# Patient Record
Sex: MALE | Race: WHITE | NOT HISPANIC OR LATINO | Employment: FULL TIME | URBAN - METROPOLITAN AREA
[De-identification: names, ages, dates, MRNs, and addresses within clinical notes are randomized per-mention and may not be internally consistent; named-entity substitution may affect disease eponyms.]

---

## 2022-06-12 ENCOUNTER — HOSPITAL ENCOUNTER (EMERGENCY)
Facility: CLINIC | Age: 47
Discharge: HOME | End: 2022-06-12
Attending: EMERGENCY MEDICINE | Admitting: EMERGENCY MEDICINE
Payer: OTHER MISCELLANEOUS

## 2022-06-12 VITALS
HEART RATE: 110 BPM | OXYGEN SATURATION: 100 % | RESPIRATION RATE: 12 BRPM | SYSTOLIC BLOOD PRESSURE: 150 MMHG | HEIGHT: 73 IN | BODY MASS INDEX: 27.83 KG/M2 | WEIGHT: 210 LBS | DIASTOLIC BLOOD PRESSURE: 84 MMHG | TEMPERATURE: 98.5 F

## 2022-06-12 DIAGNOSIS — E86.0 DEHYDRATION: ICD-10-CM

## 2022-06-12 LAB
ATRIAL RATE - MUSE: 107 BPM
COHGB MFR BLD: 1.4 % (ref 0–2)
DIASTOLIC BLOOD PRESSURE - MUSE: NORMAL MMHG
INTERPRETATION ECG - MUSE: NORMAL
P AXIS - MUSE: 69 DEGREES
PR INTERVAL - MUSE: 160 MS
QRS DURATION - MUSE: 96 MS
QT - MUSE: 336 MS
QTC - MUSE: 448 MS
R AXIS - MUSE: 47 DEGREES
SYSTOLIC BLOOD PRESSURE - MUSE: NORMAL MMHG
T AXIS - MUSE: 49 DEGREES
VENTRICULAR RATE- MUSE: 107 BPM

## 2022-06-12 PROCEDURE — 99284 EMERGENCY DEPT VISIT MOD MDM: CPT

## 2022-06-12 PROCEDURE — 36415 COLL VENOUS BLD VENIPUNCTURE: CPT | Performed by: EMERGENCY MEDICINE

## 2022-06-12 PROCEDURE — 82375 ASSAY CARBOXYHB QUANT: CPT | Performed by: EMERGENCY MEDICINE

## 2022-06-12 PROCEDURE — 93005 ELECTROCARDIOGRAM TRACING: CPT

## 2022-06-12 NOTE — ED NOTES
Patient discharged to home. VSS, review of medications and instructions for follow up have been completed
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
## 2022-09-11 ENCOUNTER — HOSPITAL ENCOUNTER (EMERGENCY)
Facility: CLINIC | Age: 47
Discharge: HOME | End: 2022-09-11
Attending: EMERGENCY MEDICINE | Admitting: EMERGENCY MEDICINE
Payer: COMMERCIAL

## 2022-09-11 VITALS
HEART RATE: 97 BPM | SYSTOLIC BLOOD PRESSURE: 114 MMHG | DIASTOLIC BLOOD PRESSURE: 86 MMHG | BODY MASS INDEX: 27.83 KG/M2 | WEIGHT: 210 LBS | HEIGHT: 73 IN | RESPIRATION RATE: 13 BRPM | TEMPERATURE: 97.1 F | OXYGEN SATURATION: 98 %

## 2022-09-11 DIAGNOSIS — I48.91 ATRIAL FIBRILLATION WITH RVR (H): ICD-10-CM

## 2022-09-11 LAB
ALBUMIN SERPL-MCNC: 4 G/DL (ref 3.4–5)
ALP SERPL-CCNC: 44 U/L (ref 40–150)
ALT SERPL W P-5'-P-CCNC: 55 U/L (ref 0–70)
ANION GAP SERPL CALCULATED.3IONS-SCNC: 10 MMOL/L (ref 3–14)
AST SERPL W P-5'-P-CCNC: 25 U/L (ref 0–45)
ATRIAL RATE - MUSE: 95 BPM
BASOPHILS # BLD AUTO: 0.1 10E3/UL (ref 0–0.2)
BASOPHILS NFR BLD AUTO: 1 %
BILIRUB SERPL-MCNC: 0.4 MG/DL (ref 0.2–1.3)
BUN SERPL-MCNC: 20 MG/DL (ref 7–30)
CALCIUM SERPL-MCNC: 9.1 MG/DL (ref 8.5–10.1)
CHLORIDE BLD-SCNC: 99 MMOL/L (ref 94–109)
CO2 SERPL-SCNC: 24 MMOL/L (ref 20–32)
CREAT SERPL-MCNC: 1.33 MG/DL (ref 0.66–1.25)
DIASTOLIC BLOOD PRESSURE - MUSE: NORMAL MMHG
EOSINOPHIL # BLD AUTO: 0.1 10E3/UL (ref 0–0.7)
EOSINOPHIL NFR BLD AUTO: 1 %
ERYTHROCYTE [DISTWIDTH] IN BLOOD BY AUTOMATED COUNT: 12.3 % (ref 10–15)
GFR SERPL CREATININE-BSD FRML MDRD: 67 ML/MIN/1.73M2
GLUCOSE BLD-MCNC: 113 MG/DL (ref 70–99)
HCT VFR BLD AUTO: 41.3 % (ref 40–53)
HGB BLD-MCNC: 14.3 G/DL (ref 13.3–17.7)
HOLD SPECIMEN: NORMAL
IMM GRANULOCYTES # BLD: 0 10E3/UL
IMM GRANULOCYTES NFR BLD: 0 %
INTERPRETATION ECG - MUSE: NORMAL
LYMPHOCYTES # BLD AUTO: 2 10E3/UL (ref 0.8–5.3)
LYMPHOCYTES NFR BLD AUTO: 28 %
MCH RBC QN AUTO: 30 PG (ref 26.5–33)
MCHC RBC AUTO-ENTMCNC: 34.6 G/DL (ref 31.5–36.5)
MCV RBC AUTO: 87 FL (ref 78–100)
MONOCYTES # BLD AUTO: 0.6 10E3/UL (ref 0–1.3)
MONOCYTES NFR BLD AUTO: 9 %
NEUTROPHILS # BLD AUTO: 4.4 10E3/UL (ref 1.6–8.3)
NEUTROPHILS NFR BLD AUTO: 61 %
NRBC # BLD AUTO: 0 10E3/UL
NRBC BLD AUTO-RTO: 0 /100
P AXIS - MUSE: 44 DEGREES
PLATELET # BLD AUTO: 239 10E3/UL (ref 150–450)
POTASSIUM BLD-SCNC: 4.3 MMOL/L (ref 3.4–5.3)
PR INTERVAL - MUSE: 152 MS
PROT SERPL-MCNC: 7.4 G/DL (ref 6.8–8.8)
QRS DURATION - MUSE: 86 MS
QT - MUSE: 326 MS
QTC - MUSE: 409 MS
R AXIS - MUSE: 27 DEGREES
RBC # BLD AUTO: 4.76 10E6/UL (ref 4.4–5.9)
SODIUM SERPL-SCNC: 133 MMOL/L (ref 133–144)
SYSTOLIC BLOOD PRESSURE - MUSE: NORMAL MMHG
T AXIS - MUSE: 52 DEGREES
TROPONIN I SERPL HS-MCNC: 50 NG/L
TSH SERPL DL<=0.005 MIU/L-ACNC: 2.09 MU/L (ref 0.4–4)
VENTRICULAR RATE- MUSE: 95 BPM
WBC # BLD AUTO: 7.2 10E3/UL (ref 4–11)

## 2022-09-11 PROCEDURE — 84443 ASSAY THYROID STIM HORMONE: CPT | Performed by: EMERGENCY MEDICINE

## 2022-09-11 PROCEDURE — 85025 COMPLETE CBC W/AUTO DIFF WBC: CPT | Performed by: EMERGENCY MEDICINE

## 2022-09-11 PROCEDURE — 99285 EMERGENCY DEPT VISIT HI MDM: CPT | Mod: 25

## 2022-09-11 PROCEDURE — 96361 HYDRATE IV INFUSION ADD-ON: CPT | Mod: 59

## 2022-09-11 PROCEDURE — 93005 ELECTROCARDIOGRAM TRACING: CPT | Mod: 76

## 2022-09-11 PROCEDURE — 96360 HYDRATION IV INFUSION INIT: CPT | Mod: 59

## 2022-09-11 PROCEDURE — 92960 CARDIOVERSION ELECTRIC EXT: CPT

## 2022-09-11 PROCEDURE — 36415 COLL VENOUS BLD VENIPUNCTURE: CPT | Performed by: EMERGENCY MEDICINE

## 2022-09-11 PROCEDURE — 999N000054 HC STATISTIC EKG NON-CHARGEABLE

## 2022-09-11 PROCEDURE — 99152 MOD SED SAME PHYS/QHP 5/>YRS: CPT

## 2022-09-11 PROCEDURE — 80053 COMPREHEN METABOLIC PANEL: CPT | Performed by: EMERGENCY MEDICINE

## 2022-09-11 PROCEDURE — 250N000011 HC RX IP 250 OP 636: Performed by: EMERGENCY MEDICINE

## 2022-09-11 PROCEDURE — 258N000003 HC RX IP 258 OP 636: Performed by: EMERGENCY MEDICINE

## 2022-09-11 PROCEDURE — 84484 ASSAY OF TROPONIN QUANT: CPT | Performed by: EMERGENCY MEDICINE

## 2022-09-11 PROCEDURE — 93005 ELECTROCARDIOGRAM TRACING: CPT

## 2022-09-11 PROCEDURE — 82040 ASSAY OF SERUM ALBUMIN: CPT | Performed by: EMERGENCY MEDICINE

## 2022-09-11 RX ORDER — PROPOFOL 10 MG/ML
INJECTION, EMULSION INTRAVENOUS
Status: DISCONTINUED
Start: 2022-09-11 | End: 2022-09-11 | Stop reason: HOSPADM

## 2022-09-11 RX ORDER — PROPOFOL 10 MG/ML
INJECTION, EMULSION INTRAVENOUS DAILY PRN
Status: COMPLETED | OUTPATIENT
Start: 2022-09-11 | End: 2022-09-11

## 2022-09-11 RX ADMIN — SODIUM CHLORIDE 1000 ML: 9 INJECTION, SOLUTION INTRAVENOUS at 03:29

## 2022-09-11 RX ADMIN — PROPOFOL 70 MG: 10 INJECTION, EMULSION INTRAVENOUS at 03:52

## 2022-09-11 ASSESSMENT — ACTIVITIES OF DAILY LIVING (ADL): ADLS_ACUITY_SCORE: 35

## 2022-09-11 ASSESSMENT — ENCOUNTER SYMPTOMS
SHORTNESS OF BREATH: 1
NAUSEA: 1
VOMITING: 1
SLEEP DISTURBANCE: 1

## 2022-09-11 NOTE — ED NOTES
Patient resting comfortably in bed. Denies any cardiac symptoms. HR 92 NSR on monitor. No further needs at this time.

## 2022-09-11 NOTE — ED TRIAGE NOTES
Came in from home. Around 2200 last night got dizzy and nauseated. Started having weakness and chest pains after laying down. Afib rvr on EKG in triage. States 8-9 beers last night.      Triage Assessment     Row Name 09/11/22 0320       Triage Assessment (Adult)    Airway WDL WDL       Respiratory WDL    Respiratory WDL WDL       Skin Circulation/Temperature WDL    Skin Circulation/Temperature WDL WDL       Cardiac WDL    Cardiac WDL --  afib rvr 180s       Peripheral/Neurovascular WDL    Peripheral Neurovascular WDL WDL       Cognitive/Neuro/Behavioral WDL    Cognitive/Neuro/Behavioral WDL WDL

## 2022-09-11 NOTE — ED PROVIDER NOTES
History   Chief Complaint:  Chest Pain       The history is provided by the patient.      Rehan Khan is a 46 year old male with history of hyperlipidemia and hypertension who presents with palpitations. The patient provides that at 2200 tonight, he suddenly developed lightheadedness, nausea, vomiting, and palpitations. He tried going to bed but notes that he had shortness of breath, chest pain, shoulder pain, and neck pain that made it difficult to sleep. He has not had this before and denies having any leg pain. He further denies any tobacco, alcohol, or drug use.    Review of Systems   Respiratory: Positive for shortness of breath.    Cardiovascular: Positive for chest pain.   Gastrointestinal: Positive for nausea and vomiting.   Musculoskeletal:        (+) left shoulder and neck pain  (-) leg pain   Psychiatric/Behavioral: Positive for sleep disturbance.   All other systems reviewed and are negative.      Allergies:  The patient has no known allergies.     Medications:  Zestril  Revatio    Past Medical History:     Hyperlipidemia  Hyperglycemia  Erectile dysfunction  Hypertension    Past Surgical History:    Hernia repair  ACL reconstruction    Family History:    Cancer  Melanoma    Social History:  Patient came from home.  Patient is accompanied in the ED by his wife.  Patient denies tobacco use.  Patient denies alcohol use.  PCP: No Ref-Primary, Physician     Physical Exam     Patient Vitals for the past 24 hrs:   BP Temp Temp src Pulse Resp SpO2 Height Weight   09/11/22 0545 114/86 -- -- 97 13 98 % -- --   09/11/22 0530 122/85 -- -- 93 13 96 % -- --   09/11/22 0515 120/84 -- -- 90 15 98 % -- --   09/11/22 0500 117/82 -- -- 90 14 97 % -- --   09/11/22 0445 108/80 -- -- 93 14 97 % -- --   09/11/22 0430 108/75 -- -- 96 16 95 % -- --   09/11/22 0415 119/77 -- -- 93 16 97 % -- --   09/11/22 0410 109/76 -- -- 90 14 99 % -- --   09/11/22 0405 112/77 -- -- 92 16 99 % -- --   09/11/22 0400 108/75 -- -- 90 16  "96 % -- --   09/11/22 0355 116/80 -- -- (!) 163 11 100 % -- --   09/11/22 0350 (!) 139/116 -- -- (!) 168 21 95 % -- --   09/11/22 0345 -- -- -- (!) 174 17 -- -- --   09/11/22 0340 (!) 134/120 -- -- (!) 174 14 -- -- --   09/11/22 0337 -- 97.1  F (36.2  C) Temporal -- -- -- -- --   09/11/22 0330 (!) 119/91 -- -- (!) 165 16 100 % -- --   09/11/22 0320 (!) 88/65 -- -- (!) 176 16 100 % -- --   09/11/22 0318 -- -- -- (!) 181 18 100 % 1.854 m (6' 1\") 95.3 kg (210 lb)   09/11/22 0307 100/40 -- -- 61 20 99 % -- --       Physical Exam  General: Appears well-developed and well-nourished.   Head: No signs of trauma.   CV: Tachycardic   Resp: Effort normal and breath sounds normal. No respiratory distress.   GI: Soft. There is no tenderness.  No rebound or guarding.  Normal bowel sounds.   MSK: Normal range of motion. no edema. No Calf tenderness.  Neuro: The patient is alert and oriented.  Strength in upper/lower extremities normal and symmetrical. Sensation normal. Speech normal.  Skin: Skin is warm and dry. No rash noted.   Psych: normal mood and affect. behavior is normal.       Emergency Department Course     ECG  ECG obtained at 0310, ECG read at 0326  Atrial fibrillation with RVR  Abnormal ECG  Rate 176 bpm. RI interval * ms. QRS duration 80 ms. QT/QTc 244/417 ms. P-R-T axes * 42 48.    ECG #2  ECG obtained at 0356, ECG read at 0356  Normal sinus rhythm  Cannot rule out anterior infarct, age undetermined  Abnormal ECG  Rate 95 bpm. RI interval 152 ms. QRS duration 86 ms. QT/QTc 326/409 ms. P-R-T axes 44 27 52.    Laboratory:  Labs Ordered and Resulted from Time of ED Arrival to Time of ED Departure   COMPREHENSIVE METABOLIC PANEL - Abnormal       Result Value    Sodium 133      Potassium 4.3      Chloride 99      Carbon Dioxide (CO2) 24      Anion Gap 10      Urea Nitrogen 20      Creatinine 1.33 (*)     Calcium 9.1      Glucose 113 (*)     Alkaline Phosphatase 44      AST 25      ALT 55      Protein Total 7.4      " Albumin 4.0      Bilirubin Total 0.4      GFR Estimate 67     TROPONIN I - Normal    Troponin I High Sensitivity 50     TSH WITH FREE T4 REFLEX - Normal    TSH 2.09     CBC WITH PLATELETS AND DIFFERENTIAL    WBC Count 7.2      RBC Count 4.76      Hemoglobin 14.3      Hematocrit 41.3      MCV 87      MCH 30.0      MCHC 34.6      RDW 12.3      Platelet Count 239      % Neutrophils 61      % Lymphocytes 28      % Monocytes 9      % Eosinophils 1      % Basophils 1      % Immature Granulocytes 0      NRBCs per 100 WBC 0      Absolute Neutrophils 4.4      Absolute Lymphocytes 2.0      Absolute Monocytes 0.6      Absolute Eosinophils 0.1      Absolute Basophils 0.1      Absolute Immature Granulocytes 0.0      Absolute NRBCs 0.0          Procedures       Sedation     Procedure: Procedural Sedation    Sedation Level: Moderate    Indication: Cardioversion    Consent: Verbal from Patient    Universal protocol: Universal protocol was followed and time out conducted just prior to starting procedure, confirming patient identity, site/side, procedure, patient position, and availability of correct equipment and implants.     Last PO Intake: Emergent procedure    ASA Class: Class 1 - A normal healthy patient     Exam:  Mallampati:  Grade 1 - Soft palate, uvula, and pillars visible    Lungs: Clear   Heart: Tachycardic    Medication: Propofol  Dose: 70 mg     Monitoring:  Monitoring consisted of heart rate, cardiac, continuous pulse oximetry, frequent blood pressure checks.   There was constant attendance by RN until patient recovered and constant attendance by physician until patient stable.   Intubation and emergency airway equipment available.     Response: Vital signs stable, oxygen saturations greater than 92%       Patient Status: Post procedure patient was alert.     Total Physician Drug Administration / Monitoring Time: 10 minutes.     Patient was monitored during recovery and returned to pre-procedure baseline.      Electrical Cardioversion         Procedure: Electrical Cardioversion        Indication: Atrial Fibrillation     Consent: Verbal from Patient     Universal Protocol: Universal protocol was followed and time out conducted just prior to starting procedure, confirming patient identity, site/side, procedure, patient position, and availability of correct equipment and implants.      Sedation: The patient was sedated, see separate procedure note for details.      Procedure Detail:   Electrodes were placed: Anterior/posterior  Trial #1: Synchronized Cardioversion at 120 Joules   Cardioversion was successful      Patient Status:  The patient tolerated the procedure well: Yes. There were no complications.    Emergency Department Course:       Reviewed:  I reviewed nursing notes, vitals, past medical history and Care Everywhere    Assessments:  0326 I obtained history and examined the patient as noted above.   0345 RT is in the room.  0351 Sedation and cardioversion were performed.  0455 I rechecked the patient and explained findings.   0557 I rechecked the patient.    Interventions:  0329 NS 1000 mL IV  0352 Propofol 70 mg IV    Disposition:  The patient was discharged to home.     Impression & Plan     CMS Diagnoses: None    Medical Decision Making:  Rehan Khan is a 46-year-old gentleman who presents due to chest discomfort, racing heart rate, not feeling well.  Symptoms started fairly suddenly at 10 PM tonight.  On presentation he was noted to be in A. fib with RVR.  He does not have a prior history of this.  After discussion of risk and benefits with the patient, I did proceed with cardioversion which was successful and the patient tolerated it quite well.  Following this he had resolution of his symptoms.  I did obtain screening blood work which was reassuring.  While the patient had recent surgery on his foot, he does not have any significant immobilization and has been moving and clinically I do not suspect a  pulmonary embolism.  Patient was discharged home with outpatient follow-up with primary care doctor and I did put in referral for cardiology.  I did not start the patient on anticoagulation given his lack of risk factors and this being a single episode.  He was instructed to return for any further symptoms or further concerns.      Diagnosis:    ICD-10-CM    1. Atrial fibrillation with RVR (H)  I48.91 Adult Cardiology Eval  Referral       Discharge Medications:  There are no discharge medications for this patient.      Scribe Disclosure:  I, Neri Faria, am serving as a scribe at 3:34 AM on 9/11/2022 to document services personally performed by Agustín Kaiser MD based on my observations and the provider's statements to me.        Agustín Kaiser MD  09/11/22 6470

## 2022-09-13 ENCOUNTER — LAB (OUTPATIENT)
Dept: LAB | Facility: CLINIC | Age: 47
End: 2022-09-13

## 2022-09-13 ENCOUNTER — OFFICE VISIT (OUTPATIENT)
Dept: CARDIOLOGY | Facility: CLINIC | Age: 47
End: 2022-09-13
Attending: EMERGENCY MEDICINE
Payer: COMMERCIAL

## 2022-09-13 VITALS
WEIGHT: 210.2 LBS | DIASTOLIC BLOOD PRESSURE: 89 MMHG | HEIGHT: 73 IN | HEART RATE: 79 BPM | SYSTOLIC BLOOD PRESSURE: 131 MMHG | OXYGEN SATURATION: 96 % | BODY MASS INDEX: 27.86 KG/M2

## 2022-09-13 DIAGNOSIS — R07.2 PRECORDIAL PAIN: ICD-10-CM

## 2022-09-13 DIAGNOSIS — I10 PRIMARY HYPERTENSION: ICD-10-CM

## 2022-09-13 DIAGNOSIS — I48.0 PAROXYSMAL ATRIAL FIBRILLATION (H): ICD-10-CM

## 2022-09-13 DIAGNOSIS — I48.0 PAROXYSMAL ATRIAL FIBRILLATION (H): Primary | ICD-10-CM

## 2022-09-13 LAB
CREAT SERPL-MCNC: 1.01 MG/DL (ref 0.66–1.25)
GFR SERPL CREATININE-BSD FRML MDRD: >90 ML/MIN/1.73M2

## 2022-09-13 PROCEDURE — 99204 OFFICE O/P NEW MOD 45 MIN: CPT | Performed by: INTERNAL MEDICINE

## 2022-09-13 PROCEDURE — 82565 ASSAY OF CREATININE: CPT | Performed by: INTERNAL MEDICINE

## 2022-09-13 PROCEDURE — 36415 COLL VENOUS BLD VENIPUNCTURE: CPT | Performed by: INTERNAL MEDICINE

## 2022-09-13 RX ORDER — LISINOPRIL 20 MG/1
TABLET ORAL
COMMUNITY
Start: 2022-08-15

## 2022-09-13 RX ORDER — ASPIRIN 81 MG/1
325 TABLET, COATED ORAL DAILY
COMMUNITY
Start: 2022-08-31

## 2022-09-13 NOTE — LETTER
9/13/2022    KI [address] K[address] [place] [address]    RE: Rehan Douglasscher       Dear Colleague,     I had the pleasure of seeing Rehan Khan in the [facility].  HPI and Plan:   I had the pleasure of seeing Rehan Khan in cardiology clinic for new onset atrial fibrillation.    Rehan is a pleasant 46-year-old male.  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  For the last year he has been having some episodes of exertional shortness of breath as well as his heart rate seems to remain higher for a longer time.  On Saturday night he felt sick.  He was having nausea and vomiting as well as rapid palpitations.  He felt dizzy and was almost passing out before trying to get to bed.  Symptoms persisted at 1 AM he called his wife and they decided to go to the emergency room.  He also had some mild chest discomfort with the palpitations.  He was found to be in rapid atrial fibrillation in the emergency room.  That EKG is not loaded on [facility].  The patient told me that it was done in a separate room and they were not able to loaded.  Subsequent EKGs are in epic but it reveals sinus rhythm.  He was emergently cardioverted and in the emergency room and converted to sinus rhythm.  Since then he has been on aspirin.    He has felt tired since that episode but no recurrent palpitations.  He does have family history of atrial fibrillation with his mother having the same episodes in her 50s and has had 4 episodes since then.  No family history of premature CAD.    His TSH during recent ER visit was normal.  His troponins were normal.  His creatinine was mildly elevated 1.33 but he felt dehydrated.  His creatinine was normal on August 15.    On exam, regular rate and rhythm without murmurs.  Chest was clear to auscultation.  No carotid bruits.  JVP is not raised.  Head is normocephalic.  No focal deficits.  No  peripheral edema    Impression  1.  New onset atrial fibrillation  Patient had an episode of atrial fibrillation with rapid rate recently and required cardioversion in the emergency room.  Subsequent EKGs revealed sinus rhythm.  I was not able to locate the EKG which demonstrated atrial fibrillation.  His TSH is normal.  We will get an echocardiogram to rule out underlying structural heart disease.  His chads vascular score is 1 given longstanding history of hypertension.  At the chads vascular score of 1, he can either be on aspirin or anticoagulation and he prefers to continue aspirin at this time to prevent stroke.  I suggested increasing aspirin to at least 162 to 325 mg daily if no side effects.  Given this was first episode, I am not putting him on a longstanding beta-blocker yet.  If there is recurrent episodes, we may have to consider that or antiarrhythmic therapy or ablation.  At that time will refer him to electrophysiologist.    2.  Chest pain and heartburn  He had episode of chest pain with rapid heart rate as well as heartburn prior to this episode.  He also has exertional shortness of breath for last 1 year.  Given the symptoms, I would like to rule out severe CAD.  Have suggested a CT coronary angiography and we discussed the procedure including dye exposure as well as radiation exposure.  In addition, his creatinine will be checked again to make sure it is normal before we proceed with it.  If CAD is excluded and he continues to have heartburn, gastroesophageal reflux disease should be excluded.    3.  Mild hypertriglyceridemia, triglyceride 231 in August.  LDL was 95 and HDL 48.  He will benefit from low carb diet and avoidance of simple carbohydrates and sugars.    I will have him see us in follow-up with my nurse practitioner after the tests are done.  If they are essentially normal, we can also call him with the results and then have him follow-up with me on an annual basis or earlier as  needed.    Thank you for allowing us to part in the care of his nice patient.    Sincerely,    Jasmeet Vo MD      Today's clinic visit entailed:  Review of external notes as documented elsewhere in note  Review of the result(s) of each unique test - ekg, cbc, bmp, tsh  The following tests were independently interpreted by me as noted in my documentation: ekg  Ordering of each unique test  Prescription drug management  Provider  Link to MDM Help Grid     The level of medical decision making during this visit was of moderate complexity.      Orders Placed This Encounter   Procedures     Creatinine     Follow-Up with Cardiology MADAN     Follow-Up with Cardiology     Echocardiogram Complete       Orders Placed This Encounter   Medications     lisinopril (ZESTRIL) 20 MG tablet     ASPIRIN LOW DOSE 81 MG EC tablet     Sig: Take 81 mg by mouth daily       There are no discontinued medications.      Encounter Diagnoses   Name Primary?     Paroxysmal atrial fibrillation (H) Yes     Primary hypertension      Precordial pain        CURRENT MEDICATIONS:  Current Outpatient Medications   Medication Sig Dispense Refill     ASPIRIN LOW DOSE 81 MG EC tablet Take 81 mg by mouth daily       lisinopril (ZESTRIL) 20 MG tablet          ALLERGIES   No Known Allergies    PAST MEDICAL HISTORY:  Hypertension  PAST SURGICAL HISTORY:  None relevant to this visit  FAMILY HISTORY:  Family history of atrial fibrillation  SOCIAL HISTORY:  Social History     Socioeconomic History     Marital status:      Spouse name: None     Number of children: None     Years of education: None     Highest education level: None   Tobacco Use     Smoking status: Never Smoker     Smokeless tobacco: Never Used   Substance and Sexual Activity     Alcohol use: Yes     Comment: daily     Drug use: Never       Review of Systems:  Skin:          Eyes:         ENT:         Respiratory:  Positive for cough;dyspnea on exertion cough: in the morning  "  Cardiovascular:  chest pain;Negative;edema;dizziness;lightheadedness palpitations;Positive for;fatigue    Gastroenterology:        Genitourinary:         Musculoskeletal:         Neurologic:         Psychiatric:         Heme/Lymph/Imm:  Negative allergies    Endocrine:  Negative        Physical Exam:  Vitals: /89 (BP Location: Left arm, Patient Position: Sitting)   Pulse 79   Ht 1.854 m (6' 1\")   Wt 95.3 kg (210 lb 3.2 oz)   SpO2 96%   BMI 27.73 kg/m        CC  Agustín Kaiser MD  [facility]  [address] [place] [address]    Thank you for allowing me to participate in the care of your patient.      Sincerely,     Jasmeet Vo MD     [facility]  "

## 2022-09-13 NOTE — PROGRESS NOTES
HPI and Plan:   I had the pleasure of seeing Rehan Khan in cardiology clinic for new onset atrial fibrillation.    Rehan is a pleasant 46-year-old male.  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  For the last year he has been having some episodes of exertional shortness of breath as well as his heart rate seems to remain higher for a longer time.  On Saturday night he felt sick.  He was having nausea and vomiting as well as rapid palpitations.  He felt dizzy and was almost passing out before trying to get to bed.  Symptoms persisted at 1 AM he called his wife and they decided to go to the emergency room.  He also had some mild chest discomfort with the palpitations.  He was found to be in rapid atrial fibrillation in the emergency room.  That EKG is not loaded on [facility].  The patient told me that it was done in a separate room and they were not able to loaded.  Subsequent EKGs are in epic but it reveals sinus rhythm.  He was emergently cardioverted and in the emergency room and converted to sinus rhythm.  Since then he has been on aspirin.    He has felt tired since that episode but no recurrent palpitations.  He does have family history of atrial fibrillation with his mother having the same episodes in her 50s and has had 4 episodes since then.  No family history of premature CAD.    His TSH during recent ER visit was normal.  His troponins were normal.  His creatinine was mildly elevated 1.33 but he felt dehydrated.  His creatinine was normal on August 15.    On exam, regular rate and rhythm without murmurs.  Chest was clear to auscultation.  No carotid bruits.  JVP is not raised.  Head is normocephalic.  No focal deficits.  No peripheral edema    Impression  1.  New onset atrial fibrillation  Patient had an episode of atrial fibrillation with rapid rate recently and required cardioversion in the emergency room.  Subsequent EKGs revealed sinus rhythm.  I was  not able to locate the EKG which demonstrated atrial fibrillation.  His TSH is normal.  We will get an echocardiogram to rule out underlying structural heart disease.  His chads vascular score is 1 given longstanding history of hypertension.  At the chads vascular score of 1, he can either be on aspirin or anticoagulation and he prefers to continue aspirin at this time to prevent stroke.  I suggested increasing aspirin to at least 162 to 325 mg daily if no side effects.  Given this was first episode, I am not putting him on a longstanding beta-blocker yet.  If there is recurrent episodes, we may have to consider that or antiarrhythmic therapy or ablation.  At that time will refer him to electrophysiologist.    2.  Chest pain and heartburn  He had episode of chest pain with rapid heart rate as well as heartburn prior to this episode.  He also has exertional shortness of breath for last 1 year.  Given the symptoms, I would like to rule out severe CAD.  Have suggested a CT coronary angiography and we discussed the procedure including dye exposure as well as radiation exposure.  In addition, his creatinine will be checked again to make sure it is normal before we proceed with it.  If CAD is excluded and he continues to have heartburn, gastroesophageal reflux disease should be excluded.    3.  Mild hypertriglyceridemia, triglyceride 231 in August.  LDL was 95 and HDL 48.  He will benefit from low carb diet and avoidance of simple carbohydrates and sugars.    I will have him see us in follow-up with my nurse practitioner after the tests are done.  If they are essentially normal, we can also call him with the results and then have him follow-up with me on an annual basis or earlier as needed.    Thank you for allowing us to part in the care of his nice patient.    Sincerely,    Jasmeet Vo MD      Today's clinic visit entailed:  Review of external notes as documented elsewhere in note  Review of the result(s) of each  unique test - ekg, cbc, bmp, tsh  The following tests were independently interpreted by me as noted in my documentation: ekg  Ordering of each unique test  Prescription drug management  Provider  Link to MDM Help Grid     The level of medical decision making during this visit was of moderate complexity.      Orders Placed This Encounter   Procedures     Creatinine     Follow-Up with Cardiology MADAN     Follow-Up with Cardiology     Echocardiogram Complete       Orders Placed This Encounter   Medications     lisinopril (ZESTRIL) 20 MG tablet     ASPIRIN LOW DOSE 81 MG EC tablet     Sig: Take 81 mg by mouth daily       There are no discontinued medications.      Encounter Diagnoses   Name Primary?     Paroxysmal atrial fibrillation (H) Yes     Primary hypertension      Precordial pain        CURRENT MEDICATIONS:  Current Outpatient Medications   Medication Sig Dispense Refill     ASPIRIN LOW DOSE 81 MG EC tablet Take 81 mg by mouth daily       lisinopril (ZESTRIL) 20 MG tablet          ALLERGIES   No Known Allergies    PAST MEDICAL HISTORY:  Hypertension  PAST SURGICAL HISTORY:  None relevant to this visit  FAMILY HISTORY:  Family history of atrial fibrillation  SOCIAL HISTORY:  Social History     Socioeconomic History     Marital status:      Spouse name: None     Number of children: None     Years of education: None     Highest education level: None   Tobacco Use     Smoking status: Never Smoker     Smokeless tobacco: Never Used   Substance and Sexual Activity     Alcohol use: Yes     Comment: daily     Drug use: Never       Review of Systems:  Skin:          Eyes:         ENT:         Respiratory:  Positive for cough;dyspnea on exertion cough: in the morning   Cardiovascular:  chest pain;Negative;edema;dizziness;lightheadedness palpitations;Positive for;fatigue    Gastroenterology:        Genitourinary:         Musculoskeletal:         Neurologic:         Psychiatric:         Heme/Lymph/Imm:  Negative  "allergies    Endocrine:  Negative        Physical Exam:  Vitals: /89 (BP Location: Left arm, Patient Position: Sitting)   Pulse 79   Ht 1.854 m (6' 1\")   Wt 95.3 kg (210 lb 3.2 oz)   SpO2 96%   BMI 27.73 kg/m        CC  Agustín Kaiser MD  [facility]  [id number] [place] [address]              "

## 2022-09-26 ENCOUNTER — HOSPITAL ENCOUNTER (OUTPATIENT)
Dept: CARDIOLOGY | Facility: CLINIC | Age: 47
Discharge: HOME | End: 2022-09-26
Attending: INTERNAL MEDICINE
Payer: COMMERCIAL

## 2022-09-26 VITALS — SYSTOLIC BLOOD PRESSURE: 118 MMHG | HEART RATE: 67 BPM | DIASTOLIC BLOOD PRESSURE: 78 MMHG

## 2022-09-26 DIAGNOSIS — I48.0 PAROXYSMAL ATRIAL FIBRILLATION (H): ICD-10-CM

## 2022-09-26 DIAGNOSIS — R07.2 PRECORDIAL PAIN: ICD-10-CM

## 2022-09-26 LAB — LVEF ECHO: NORMAL

## 2022-09-26 PROCEDURE — 75574 CT ANGIO HRT W/3D IMAGE: CPT | Mod: 26 | Performed by: INTERNAL MEDICINE

## 2022-09-26 PROCEDURE — 250N000013 HC RX MED GY IP 250 OP 250 PS 637: Performed by: INTERNAL MEDICINE

## 2022-09-26 PROCEDURE — 93306 TTE W/DOPPLER COMPLETE: CPT | Mod: 26 | Performed by: INTERNAL MEDICINE

## 2022-09-26 PROCEDURE — 250N000011 HC RX IP 250 OP 636: Performed by: INTERNAL MEDICINE

## 2022-09-26 PROCEDURE — 93306 TTE W/DOPPLER COMPLETE: CPT

## 2022-09-26 PROCEDURE — 250N000009 HC RX 250: Performed by: INTERNAL MEDICINE

## 2022-09-26 PROCEDURE — 75574 CT ANGIO HRT W/3D IMAGE: CPT

## 2022-09-26 RX ORDER — ACYCLOVIR 200 MG/1
0-1 CAPSULE ORAL
Status: DISCONTINUED | OUTPATIENT
Start: 2022-09-26 | End: 2022-09-27 | Stop reason: HOSPADM

## 2022-09-26 RX ORDER — METOPROLOL TARTRATE 25 MG/1
25-100 TABLET, FILM COATED ORAL
Status: COMPLETED | OUTPATIENT
Start: 2022-09-26 | End: 2022-09-26

## 2022-09-26 RX ORDER — DILTIAZEM HYDROCHLORIDE 5 MG/ML
10-15 INJECTION INTRAVENOUS
Status: DISCONTINUED | OUTPATIENT
Start: 2022-09-26 | End: 2022-09-27 | Stop reason: HOSPADM

## 2022-09-26 RX ORDER — IOPAMIDOL 755 MG/ML
500 INJECTION, SOLUTION INTRAVASCULAR ONCE
Status: COMPLETED | OUTPATIENT
Start: 2022-09-26 | End: 2022-09-26

## 2022-09-26 RX ORDER — METOPROLOL TARTRATE 1 MG/ML
5-15 INJECTION, SOLUTION INTRAVENOUS
Status: DISCONTINUED | OUTPATIENT
Start: 2022-09-26 | End: 2022-09-27 | Stop reason: HOSPADM

## 2022-09-26 RX ORDER — DIPHENHYDRAMINE HYDROCHLORIDE 50 MG/ML
25-50 INJECTION INTRAMUSCULAR; INTRAVENOUS
Status: DISCONTINUED | OUTPATIENT
Start: 2022-09-26 | End: 2022-09-27 | Stop reason: HOSPADM

## 2022-09-26 RX ORDER — METHYLPREDNISOLONE SODIUM SUCCINATE 125 MG/2ML
125 INJECTION, POWDER, LYOPHILIZED, FOR SOLUTION INTRAMUSCULAR; INTRAVENOUS
Status: DISCONTINUED | OUTPATIENT
Start: 2022-09-26 | End: 2022-09-27 | Stop reason: HOSPADM

## 2022-09-26 RX ORDER — NITROGLYCERIN 0.4 MG/1
0.4 TABLET SUBLINGUAL
Status: DISCONTINUED | OUTPATIENT
Start: 2022-09-26 | End: 2022-09-27 | Stop reason: HOSPADM

## 2022-09-26 RX ORDER — IVABRADINE 5 MG/1
5-15 TABLET, FILM COATED ORAL
Status: COMPLETED | OUTPATIENT
Start: 2022-09-26 | End: 2022-09-26

## 2022-09-26 RX ORDER — DIPHENHYDRAMINE HCL 25 MG
25 CAPSULE ORAL
Status: DISCONTINUED | OUTPATIENT
Start: 2022-09-26 | End: 2022-09-27 | Stop reason: HOSPADM

## 2022-09-26 RX ORDER — ONDANSETRON 2 MG/ML
4 INJECTION INTRAMUSCULAR; INTRAVENOUS
Status: DISCONTINUED | OUTPATIENT
Start: 2022-09-26 | End: 2022-09-27 | Stop reason: HOSPADM

## 2022-09-26 RX ORDER — DILTIAZEM HCL 60 MG
120 TABLET ORAL
Status: DISCONTINUED | OUTPATIENT
Start: 2022-09-26 | End: 2022-09-27 | Stop reason: HOSPADM

## 2022-09-26 RX ADMIN — NITROGLYCERIN 0.4 MG: 0.4 TABLET SUBLINGUAL at 10:37

## 2022-09-26 RX ADMIN — METOPROLOL TARTRATE 10 MG: 5 INJECTION INTRAVENOUS at 09:52

## 2022-09-26 RX ADMIN — IVABRADINE 15 MG: 5 TABLET, FILM COATED ORAL at 08:25

## 2022-09-26 RX ADMIN — METOPROLOL TARTRATE 100 MG: 50 TABLET, FILM COATED ORAL at 08:25

## 2022-09-26 RX ADMIN — METOPROLOL TARTRATE 10 MG: 5 INJECTION INTRAVENOUS at 09:36

## 2022-09-26 RX ADMIN — METOPROLOL TARTRATE 10 MG: 5 INJECTION INTRAVENOUS at 09:44

## 2022-09-26 RX ADMIN — METOPROLOL TARTRATE 10 MG: 5 INJECTION INTRAVENOUS at 09:30

## 2022-09-26 RX ADMIN — IOPAMIDOL 120 ML: 755 INJECTION, SOLUTION INTRAVENOUS at 10:50

## 2022-10-01 ENCOUNTER — HEALTH MAINTENANCE LETTER (OUTPATIENT)
Age: 47
End: 2022-10-01

## 2022-10-20 ENCOUNTER — OFFICE VISIT (OUTPATIENT)
Dept: CARDIOLOGY | Facility: CLINIC | Age: 47
End: 2022-10-20
Attending: INTERNAL MEDICINE
Payer: COMMERCIAL

## 2022-10-20 VITALS
OXYGEN SATURATION: 99 % | HEART RATE: 90 BPM | DIASTOLIC BLOOD PRESSURE: 82 MMHG | HEIGHT: 73 IN | BODY MASS INDEX: 28.36 KG/M2 | WEIGHT: 214 LBS | SYSTOLIC BLOOD PRESSURE: 126 MMHG

## 2022-10-20 DIAGNOSIS — E78.1 HYPERTRIGLYCERIDEMIA: ICD-10-CM

## 2022-10-20 DIAGNOSIS — I48.0 PAROXYSMAL ATRIAL FIBRILLATION (H): ICD-10-CM

## 2022-10-20 DIAGNOSIS — R07.2 PRECORDIAL PAIN: ICD-10-CM

## 2022-10-20 DIAGNOSIS — I77.810 AORTIC ROOT DILATION (H): Primary | ICD-10-CM

## 2022-10-20 PROCEDURE — 99214 OFFICE O/P EST MOD 30 MIN: CPT | Performed by: NURSE PRACTITIONER

## 2022-10-20 NOTE — LETTER
10/20/2022    [facility] [place] [address] [place] [address]    RE: Rehan Khan       Dear Colleague,     I had the pleasure of seeing Rehan Khan in the [facility].  Cardiology Clinic Progress Note  Rehan Khan MRN# [id number]   YOB: 1975 Age: 47 year old   Primary Cardiologist: Dr. Vo Reason for visit:             Assessment and Plan:     1. Chest pain and exertional dyspnea, trivial coronary artery disease  We reviewed results of his CTA which showed trivial coronary artery disease and echo score 0.979 placing patient in the 72nd percentile compared to age and gender matched control group.  Discussed he should focus on lifestyle modification to improve his cardiac risk factors including continuing daily aspirin, maintaining LDL less than 100, and adequate hypertensive control.     We discussed the possibility of hypertensive response to exercise as the cause of his exertional dyspnea however I feel this is less likely considering how well controlled his blood pressure has been historically.  He denies any occupational smoke exposures.  His hemoglobin is stable in September 2022 at 14.3.  His echocardiogram did showed normal IVC dimensions.     2.  New onset atrial fibrillation, status post cardioversion in ER  Patient has a [id number] score of 1 and is currently taking aspirin 325mg per his preference vs anticoagulation. He has not had any known recurrence.  Did remind him if he should have recurrence of similar symptoms or an irregularly irregular pulse he should be seen in the ER.  He has not beta-blockade which is reasonable considering he has not had any reoccurrence.  Also discussed if he should have any GI irritation, or concerns for bleeding he could reduce his aspirin dose to 162 mg daily and consider taking it with a PPI.    We also discussed avoiding triggers/possible triggers for atrial fibrillation such as excess  caffeine intake, dehydration, untreated sleep apnea, excess alcohol use, hypertension, and acute illness.    3.  Mild hypertriglyceridemia, goal LDL less than 100  8/2022 triglyceride level 231, LDL 95, HDL 48.  Would advise dietary changes including plant-based diet and Mediterranean diet.    4.  Mild aortic root dilation (4.2 cm 9/2022)  Discussed importance of adequate hypertensive control and a goal of systolic blood pressure less than 130.  We will plan to repeat echocardiogram in 1 year to reassess aortic root dimensions.  If stable can likely repeat in 2 years.    5. Hypertension, SBP goal <130  Renal function normalized on repeat labs drawn 9/13/22.     Changes today: No medication changes were mad    Follow up plan: Patient to return to clinic in 1 year with an echocardiogram and fasting lipid panel prior         History of Presenting Illness:    Rehan Khan is a very pleasant 47 year old male with a history of atrial fibrillation s/p cardioversion in ER, mild hypertriglyceridemia.    Patient was seen by Dr. Vo in September 2022 in consultation after an ER visit for new onset of atrial fibrillation.  He did undergo a successful cardioversion in the emergency room and was very symptomatic with nausea, vomiting, and palpitations with the onset of the atrial fibrillation.  He had been having episodes of exertional dyspnea and a CT coronary angiogram was completed showing trivial coronary disease, total Ossian score 0.979 (all 1 right coronary artery) placing patient in the 72nd percentile compared to age and gender.  Mild aortic root dilation at 4.2 cm.      A transthoracic echocardiogram done 9/26/2022 was unremarkable with LVEF normal at 55 to 60%, right ventricle normal in structure function and size, and no valvular abnormalities.     Patient is here today for follow-up on his test results.  He continues to have exertional shortness of breath that resolves quickly and he questions if it is  "related to deconditioning    Patient denies chest pain or chest tightness. Denies dizziness, lightheadedness or other presyncopal symptoms. Denies tachycardia or palpitations. Denies orthopnea, lower extremity edema or PND.     He works as an  and also part-time for the evening for a fire department.  He notes he has been under significant stress at work lately    Labs from 9/13/2022 showed normal creatinine of 1.01 and GFR greater than 90. Blood pressure 126/82 and HR 90 in clinic today.        Recent Hospitalizations   None in 2022        Social History      Social History     Socioeconomic History     Marital status:      Spouse name: Not on file     Number of children: Not on file     Years of education: Not on file     Highest education level: Not on file   Occupational History     Not on file   Tobacco Use     Smoking status: Never     Smokeless tobacco: Never   Substance and Sexual Activity     Alcohol use: Yes     Comment: 3-4 drinks per day     Drug use: Never     Sexual activity: Not on file   Other Topics Concern     Not on file   Social History Narrative     Not on file     Social Determinants of Health     Financial Resource Strain: Not on file   Food Insecurity: Not on file   Transportation Needs: Not on file   Physical Activity: Not on file   Stress: Not on file   Social Connections: Not on file   Intimate Partner Violence: Not on file   Housing Stability: Not on file            Review of Systems:   Skin:  not assessed     Eyes:  not assessed    ENT:  not assessed    Respiratory:  Positive for dyspnea on exertion  Cardiovascular:    Positive for  Gastroenterology: not assessed    Genitourinary:  not assessed    Musculoskeletal:  not assessed    Neurologic:  not assessed    Psychiatric:  not assessed    Heme/Lymph/Imm:  Negative allergies  Endocrine:  Negative thyroid disorder;diabetes         Physical Exam:   Vitals: /82   Pulse 90   Ht 1.854 m (6' 1\")   Wt 97.1 kg (214 lb)  "  SpO2 99%   BMI 28.23 kg/m     Wt Readings from Last 4 Encounters:   10/20/22 97.1 kg (214 lb)   09/13/22 95.3 kg (210 lb 3.2 oz)   09/11/22 95.3 kg (210 lb)   06/12/22 95.3 kg (210 lb)     GEN: well nourished, in no acute distress.  HEENT:  Pupils equal, round. Sclerae nonicteric.   NECK: Supple, no masses appreciated.   C/V:  Regular rate and rhythm, no murmur, rub or gallop.    RESP: Respirations are unlabored. Clear to auscultation bilaterally without wheezing, rales, or rhonchi.  GI: Abdomen soft, nontender.  EXTREM: none LE edema.  NEURO: Alert and oriented, cooperative.  SKIN: Warm and dry.        Data:     LIPID RESULTS:  No results found for: CHOL, HDL, LDL, TRIG, CHOLHDLRATIO  LIVER ENZYME RESULTS:  Lab Results   Component Value Date    AST 25 09/11/2022    ALT 55 09/11/2022     CBC RESULTS:  Lab Results   Component Value Date    WBC 7.2 09/11/2022    RBC 4.76 09/11/2022    HGB 14.3 09/11/2022    HCT 41.3 09/11/2022    MCV 87 09/11/2022    MCH 30.0 09/11/2022    MCHC 34.6 09/11/2022    RDW 12.3 09/11/2022     09/11/2022     BMP RESULTS:  Lab Results   Component Value Date     09/11/2022    POTASSIUM 4.3 09/11/2022    CHLORIDE 99 09/11/2022    CO2 24 09/11/2022    ANIONGAP 10 09/11/2022     (H) 09/11/2022    BUN 20 09/11/2022    CR 1.01 09/13/2022    GFRESTIMATED >90 09/13/2022    CAS 9.1 09/11/2022      A1C RESULTS:  No results found for: A1C  INR RESULTS:  No results found for: INR         Medications     Current Outpatient Medications   Medication Sig Dispense Refill     ASPIRIN LOW DOSE 81 MG EC tablet Take 325 mg by mouth daily       lisinopril (ZESTRIL) 20 MG tablet             Past Medical History   No past medical history on file.  No past surgical history on file.  No family history on file.         Allergies   Patient has no known allergies.        Silva Jefferson NP  [facility]  Pager: [phone]        Thank you for allowing me to  participate in the care of your patient.      Sincerely,     Silva Jefferson NP     [facility]  cc:   Jasmeet Vo MD  [address]  [id number]  [place] [address]

## 2022-10-20 NOTE — PROGRESS NOTES
Cardiology Clinic Progress Note  Rehan Khan MRN# [id number]   YOB: 1975 Age: 47 year old   Primary Cardiologist: Dr. Vo Reason for visit:             Assessment and Plan:     1. Chest pain and exertional dyspnea, trivial coronary artery disease  We reviewed results of his CTA which showed trivial coronary artery disease and echo score 0.979 placing patient in the 72nd percentile compared to age and gender matched control group.  Discussed he should focus on lifestyle modification to improve his cardiac risk factors including continuing daily aspirin, maintaining LDL less than 100, and adequate hypertensive control.     We discussed the possibility of hypertensive response to exercise as the cause of his exertional dyspnea however I feel this is less likely considering how well controlled his blood pressure has been historically.  He denies any occupational smoke exposures.  His hemoglobin is stable in September 2022 at 14.3.  His echocardiogram did showed normal IVC dimensions.     2.  New onset atrial fibrillation, status post cardioversion in ER  Patient has a [id number] score of 1 and is currently taking aspirin 325mg per his preference vs anticoagulation. He has not had any known recurrence.  Did remind him if he should have recurrence of similar symptoms or an irregularly irregular pulse he should be seen in the ER.  He has not beta-blockade which is reasonable considering he has not had any reoccurrence.  Also discussed if he should have any GI irritation, or concerns for bleeding he could reduce his aspirin dose to 162 mg daily and consider taking it with a PPI.    We also discussed avoiding triggers/possible triggers for atrial fibrillation such as excess caffeine intake, dehydration, untreated sleep apnea, excess alcohol use, hypertension, and acute illness.    3.  Mild hypertriglyceridemia, goal LDL less than 100  8/2022 triglyceride level 231, LDL 95, HDL 48.  Would advise  dietary changes including plant-based diet and Mediterranean diet.    4.  Mild aortic root dilation (4.2 cm 9/2022)  Discussed importance of adequate hypertensive control and a goal of systolic blood pressure less than 130.  We will plan to repeat echocardiogram in 1 year to reassess aortic root dimensions.  If stable can likely repeat in 2 years.    5. Hypertension, SBP goal <130  Renal function normalized on repeat labs drawn 9/13/22.     Changes today: No medication changes were mad    Follow up plan: Patient to return to clinic in 1 year with an echocardiogram and fasting lipid panel prior         History of Presenting Illness:    Rehan Khan is a very pleasant 47 year old male with a history of atrial fibrillation s/p cardioversion in ER, mild hypertriglyceridemia.    Patient was seen by Dr. Vo in September 2022 in consultation after an ER visit for new onset of atrial fibrillation.  He did undergo a successful cardioversion in the emergency room and was very symptomatic with nausea, vomiting, and palpitations with the onset of the atrial fibrillation.  He had been having episodes of exertional dyspnea and a CT coronary angiogram was completed showing trivial coronary disease, total Niraj score 0.979 (all 1 right coronary artery) placing patient in the 72nd percentile compared to age and gender.  Mild aortic root dilation at 4.2 cm.      A transthoracic echocardiogram done 9/26/2022 was unremarkable with LVEF normal at 55 to 60%, right ventricle normal in structure function and size, and no valvular abnormalities.     Patient is here today for follow-up on his test results.  He continues to have exertional shortness of breath that resolves quickly and he questions if it is related to deconditioning    Patient denies chest pain or chest tightness. Denies dizziness, lightheadedness or other presyncopal symptoms. Denies tachycardia or palpitations. Denies orthopnea, lower extremity edema or PND.  "    He works as an  and also part-time for the evening for a fire department.  He notes he has been under significant stress at work lately    Labs from 9/13/2022 showed normal creatinine of 1.01 and GFR greater than 90. Blood pressure 126/82 and HR 90 in clinic today.        Recent Hospitalizations   None in 2022        Social History      Social History     Socioeconomic History     Marital status:      Spouse name: Not on file     Number of children: Not on file     Years of education: Not on file     Highest education level: Not on file   Occupational History     Not on file   Tobacco Use     Smoking status: Never     Smokeless tobacco: Never   Substance and Sexual Activity     Alcohol use: Yes     Comment: 3-4 drinks per day     Drug use: Never     Sexual activity: Not on file   Other Topics Concern     Not on file   Social History Narrative     Not on file     Social Determinants of Health     Financial Resource Strain: Not on file   Food Insecurity: Not on file   Transportation Needs: Not on file   Physical Activity: Not on file   Stress: Not on file   Social Connections: Not on file   Intimate Partner Violence: Not on file   Housing Stability: Not on file            Review of Systems:   Skin:  not assessed     Eyes:  not assessed    ENT:  not assessed    Respiratory:  Positive for dyspnea on exertion  Cardiovascular:    Positive for  Gastroenterology: not assessed    Genitourinary:  not assessed    Musculoskeletal:  not assessed    Neurologic:  not assessed    Psychiatric:  not assessed    Heme/Lymph/Imm:  Negative allergies  Endocrine:  Negative thyroid disorder;diabetes         Physical Exam:   Vitals: /82   Pulse 90   Ht 1.854 m (6' 1\")   Wt 97.1 kg (214 lb)   SpO2 99%   BMI 28.23 kg/m     Wt Readings from Last 4 Encounters:   10/20/22 97.1 kg (214 lb)   09/13/22 95.3 kg (210 lb 3.2 oz)   09/11/22 95.3 kg (210 lb)   06/12/22 95.3 kg (210 lb)     GEN: well nourished, in no " acute distress.  HEENT:  Pupils equal, round. Sclerae nonicteric.   NECK: Supple, no masses appreciated.   C/V:  Regular rate and rhythm, no murmur, rub or gallop.    RESP: Respirations are unlabored. Clear to auscultation bilaterally without wheezing, rales, or rhonchi.  GI: Abdomen soft, nontender.  EXTREM: none LE edema.  NEURO: Alert and oriented, cooperative.  SKIN: Warm and dry.        Data:     LIPID RESULTS:  No results found for: CHOL, HDL, LDL, TRIG, CHOLHDLRATIO  LIVER ENZYME RESULTS:  Lab Results   Component Value Date    AST 25 09/11/2022    ALT 55 09/11/2022     CBC RESULTS:  Lab Results   Component Value Date    WBC 7.2 09/11/2022    RBC 4.76 09/11/2022    HGB 14.3 09/11/2022    HCT 41.3 09/11/2022    MCV 87 09/11/2022    MCH 30.0 09/11/2022    MCHC 34.6 09/11/2022    RDW 12.3 09/11/2022     09/11/2022     BMP RESULTS:  Lab Results   Component Value Date     09/11/2022    POTASSIUM 4.3 09/11/2022    CHLORIDE 99 09/11/2022    CO2 24 09/11/2022    ANIONGAP 10 09/11/2022     (H) 09/11/2022    BUN 20 09/11/2022    CR 1.01 09/13/2022    GFRESTIMATED >90 09/13/2022    CAS 9.1 09/11/2022      A1C RESULTS:  No results found for: A1C  INR RESULTS:  No results found for: INR         Medications     Current Outpatient Medications   Medication Sig Dispense Refill     ASPIRIN LOW DOSE 81 MG EC tablet Take 325 mg by mouth daily       lisinopril (ZESTRIL) 20 MG tablet             Past Medical History   No past medical history on file.  No past surgical history on file.  No family history on file.         Allergies   Patient has no known allergies.        Silva Jefferson NP  [facility]  Pager: [phone]

## 2022-10-20 NOTE — PATIENT INSTRUCTIONS
Today's Recommendations    Your CT angiogram showed only trivial coronary disease  Your echocardiogram showed a mild dilation of your aorta, we will recheck this in 1 year  Continue all medications without changes.  Please follow up with Dr. Vo in 1 year with an echocardiogram and fasting lipid panel prior.    Please send a [employer] message or call [phone] to the RN team with questions or concerns.     Scheduling number [phone]  GINO Zapien, CNP

## 2023-10-21 ENCOUNTER — HEALTH MAINTENANCE LETTER (OUTPATIENT)
Age: 48
End: 2023-10-21

## 2024-12-08 ENCOUNTER — HEALTH MAINTENANCE LETTER (OUTPATIENT)
Age: 49
End: 2024-12-08

## (undated) RX ORDER — IVABRADINE 5 MG/1
TABLET, FILM COATED ORAL
Status: DISPENSED
Start: 2022-09-26

## (undated) RX ORDER — METOPROLOL TARTRATE 1 MG/ML
INJECTION, SOLUTION INTRAVENOUS
Status: DISPENSED
Start: 2022-09-26

## (undated) RX ORDER — METOPROLOL TARTRATE 50 MG
TABLET ORAL
Status: DISPENSED
Start: 2022-09-26